# Patient Record
Sex: FEMALE | Race: WHITE | NOT HISPANIC OR LATINO | Employment: FULL TIME | URBAN - METROPOLITAN AREA
[De-identification: names, ages, dates, MRNs, and addresses within clinical notes are randomized per-mention and may not be internally consistent; named-entity substitution may affect disease eponyms.]

---

## 2017-06-28 ENCOUNTER — PATIENT OUTREACH (OUTPATIENT)
Dept: ADMINISTRATIVE | Facility: HOSPITAL | Age: 57
End: 2017-06-28

## 2017-06-28 NOTE — PROGRESS NOTES
I have attempted without success to contact this patient by phone to schedule mammogram annual exam. Patient not available, left voicemail.

## 2017-08-28 ENCOUNTER — PATIENT OUTREACH (OUTPATIENT)
Dept: ADMINISTRATIVE | Facility: HOSPITAL | Age: 57
End: 2017-08-28

## 2018-01-25 ENCOUNTER — PATIENT OUTREACH (OUTPATIENT)
Dept: ADMINISTRATIVE | Facility: HOSPITAL | Age: 58
End: 2018-01-25

## 2018-01-25 NOTE — PROGRESS NOTES
Patient return my call. Patient will need a appointment with Dr. Garcia, per care coordination nurse. Patient changing to [facility].

## 2018-01-25 NOTE — PROGRESS NOTES
I will schedule mammogram after patient reviews her work schedule. Patient in agreement and vocalize understanding.

## 2018-01-25 NOTE — PROGRESS NOTES
Call to schedule appointment for annual mammogram. I will call patient back with date and time. Patient in agreement and vocalize understanding.